# Patient Record
Sex: FEMALE | Race: BLACK OR AFRICAN AMERICAN | Employment: PART TIME | URBAN - METROPOLITAN AREA
[De-identification: names, ages, dates, MRNs, and addresses within clinical notes are randomized per-mention and may not be internally consistent; named-entity substitution may affect disease eponyms.]

---

## 2017-05-04 ENCOUNTER — HOSPITAL ENCOUNTER (EMERGENCY)
Facility: HOSPITAL | Age: 57
Discharge: HOME | End: 2017-05-04
Attending: EMERGENCY MEDICINE
Payer: COMMERCIAL

## 2017-05-04 ENCOUNTER — APPOINTMENT (OUTPATIENT)
Dept: GENERAL RADIOLOGY | Facility: HOSPITAL | Age: 57
End: 2017-05-04
Attending: EMERGENCY MEDICINE
Payer: COMMERCIAL

## 2017-05-04 VITALS
TEMPERATURE: 98 F | DIASTOLIC BLOOD PRESSURE: 87 MMHG | HEIGHT: 62 IN | HEART RATE: 81 BPM | WEIGHT: 199 LBS | BODY MASS INDEX: 36.62 KG/M2 | RESPIRATION RATE: 15 BRPM | SYSTOLIC BLOOD PRESSURE: 154 MMHG | OXYGEN SATURATION: 100 %

## 2017-05-04 DIAGNOSIS — M75.41 SHOULDER IMPINGEMENT SYNDROME, RIGHT: Primary | ICD-10-CM

## 2017-05-04 PROCEDURE — 81001 URINALYSIS AUTO W/SCOPE: CPT | Performed by: EMERGENCY MEDICINE

## 2017-05-04 PROCEDURE — 96360 HYDRATION IV INFUSION INIT: CPT

## 2017-05-04 PROCEDURE — 85025 COMPLETE CBC W/AUTO DIFF WBC: CPT | Performed by: EMERGENCY MEDICINE

## 2017-05-04 PROCEDURE — 84484 ASSAY OF TROPONIN QUANT: CPT | Performed by: EMERGENCY MEDICINE

## 2017-05-04 PROCEDURE — 83690 ASSAY OF LIPASE: CPT | Performed by: EMERGENCY MEDICINE

## 2017-05-04 PROCEDURE — 99285 EMERGENCY DEPT VISIT HI MDM: CPT

## 2017-05-04 PROCEDURE — 87086 URINE CULTURE/COLONY COUNT: CPT | Performed by: EMERGENCY MEDICINE

## 2017-05-04 PROCEDURE — 93010 ELECTROCARDIOGRAM REPORT: CPT

## 2017-05-04 PROCEDURE — 80053 COMPREHEN METABOLIC PANEL: CPT | Performed by: EMERGENCY MEDICINE

## 2017-05-04 PROCEDURE — 73030 X-RAY EXAM OF SHOULDER: CPT | Performed by: EMERGENCY MEDICINE

## 2017-05-04 PROCEDURE — 93005 ELECTROCARDIOGRAM TRACING: CPT

## 2017-05-04 RX ORDER — HYDROCODONE BITARTRATE AND ACETAMINOPHEN 5; 325 MG/1; MG/1
2 TABLET ORAL ONCE
Status: COMPLETED | OUTPATIENT
Start: 2017-05-04 | End: 2017-05-04

## 2017-05-04 RX ORDER — HYDROCODONE BITARTRATE AND ACETAMINOPHEN 5; 325 MG/1; MG/1
1-2 TABLET ORAL EVERY 6 HOURS PRN
Qty: 20 TABLET | Refills: 0 | Status: SHIPPED | OUTPATIENT
Start: 2017-05-04 | End: 2017-05-11

## 2017-05-05 NOTE — ED PROVIDER NOTES
Patient Seen in: [facility] Emergency Department    History   Patient presents with:  Nausea/Vomiting/Diarrhea (gastrointestinal)    Stated Complaint: diarrhea    HPI    The patient is a 50-year-old female status post right rotator cuff repair 3 yea 24 Hr,  TAKE ONE TABLET BY MOUTH ONCE DAILY   Clobetasol Propionate 0.05 % External Ointment,  Apply 2 x daily   LENIN MICROLET LANCETS Does not apply Misc,  Test BGs 2X/day   Glucose Blood (LENIN CONTOUR NEXT TEST) In Vitro Strip,  Test BGs 2X/day   Blood distally throughout 470s. Sensation in the axillary, ulnar, radial, median distributions are intact bilaterally. Motor strength in the same distributions are 5/5 and symmetric. HEENT: No lymphadenopathy. Oropharynx nml.  Mucus membranes moist.   Supple PLATELET    Narrative: The following orders were created for panel order CBC WITH DIFFERENTIAL WITH PLATELET.   Procedure                               Abnormality         Status                     ---------                               ----------- exam I believe her symptoms are due to musculoskeletal right shoulder pain probably likely due to shoulder impingement such as rotator cuff injury.   Her pain is exacerbated with abduction of her shoulder and reproducible with movement of her right shoulder

## 2017-05-05 NOTE — ED NOTES
Discharge instructions were reviewed and pt verbalized understanding. Pt is going home with daughter.

## 2017-05-25 PROBLEM — M54.12 CERVICAL RADICULOPATHY: Status: ACTIVE | Noted: 2017-05-25

## 2017-05-25 PROBLEM — M75.41 IMPINGEMENT SYNDROME OF RIGHT SHOULDER: Status: ACTIVE | Noted: 2017-05-25

## 2017-06-12 ENCOUNTER — HOSPITAL ENCOUNTER (INPATIENT)
Facility: HOSPITAL | Age: 57
LOS: 2 days | Discharge: HOME | DRG: 864 | End: 2017-06-14
Attending: EMERGENCY MEDICINE | Admitting: INTERNAL MEDICINE
Payer: COMMERCIAL

## 2017-06-12 ENCOUNTER — APPOINTMENT (OUTPATIENT)
Dept: GENERAL RADIOLOGY | Facility: HOSPITAL | Age: 57
DRG: 864 | End: 2017-06-12
Attending: EMERGENCY MEDICINE
Payer: COMMERCIAL

## 2017-06-12 ENCOUNTER — APPOINTMENT (OUTPATIENT)
Dept: CT IMAGING | Facility: HOSPITAL | Age: 57
DRG: 864 | End: 2017-06-12
Attending: EMERGENCY MEDICINE
Payer: COMMERCIAL

## 2017-06-12 DIAGNOSIS — R50.9 FEVER, UNSPECIFIED FEVER CAUSE: ICD-10-CM

## 2017-06-12 DIAGNOSIS — N39.0 URINARY TRACT INFECTION, SITE UNSPECIFIED: Primary | ICD-10-CM

## 2017-06-12 DIAGNOSIS — E86.0 DEHYDRATION: ICD-10-CM

## 2017-06-12 PROCEDURE — 71260 CT THORAX DX C+: CPT | Performed by: EMERGENCY MEDICINE

## 2017-06-12 PROCEDURE — 81001 URINALYSIS AUTO W/SCOPE: CPT | Performed by: EMERGENCY MEDICINE

## 2017-06-12 PROCEDURE — 87798 DETECT AGENT NOS DNA AMP: CPT | Performed by: HOSPITALIST

## 2017-06-12 PROCEDURE — 80053 COMPREHEN METABOLIC PANEL: CPT | Performed by: EMERGENCY MEDICINE

## 2017-06-12 PROCEDURE — 96374 THER/PROPH/DIAG INJ IV PUSH: CPT

## 2017-06-12 PROCEDURE — 87040 BLOOD CULTURE FOR BACTERIA: CPT | Performed by: EMERGENCY MEDICINE

## 2017-06-12 PROCEDURE — 83605 ASSAY OF LACTIC ACID: CPT | Performed by: EMERGENCY MEDICINE

## 2017-06-12 PROCEDURE — 87633 RESP VIRUS 12-25 TARGETS: CPT | Performed by: HOSPITALIST

## 2017-06-12 PROCEDURE — 96361 HYDRATE IV INFUSION ADD-ON: CPT

## 2017-06-12 PROCEDURE — 36415 COLL VENOUS BLD VENIPUNCTURE: CPT

## 2017-06-12 PROCEDURE — 99285 EMERGENCY DEPT VISIT HI MDM: CPT

## 2017-06-12 PROCEDURE — 87086 URINE CULTURE/COLONY COUNT: CPT | Performed by: EMERGENCY MEDICINE

## 2017-06-12 PROCEDURE — 87486 CHLMYD PNEUM DNA AMP PROBE: CPT | Performed by: HOSPITALIST

## 2017-06-12 PROCEDURE — 74177 CT ABD & PELVIS W/CONTRAST: CPT | Performed by: EMERGENCY MEDICINE

## 2017-06-12 PROCEDURE — 87581 M.PNEUMON DNA AMP PROBE: CPT | Performed by: HOSPITALIST

## 2017-06-12 PROCEDURE — 83036 HEMOGLOBIN GLYCOSYLATED A1C: CPT | Performed by: HOSPITALIST

## 2017-06-12 PROCEDURE — 85025 COMPLETE CBC W/AUTO DIFF WBC: CPT | Performed by: EMERGENCY MEDICINE

## 2017-06-12 PROCEDURE — 82962 GLUCOSE BLOOD TEST: CPT

## 2017-06-12 PROCEDURE — 71010 XR CHEST AP PORTABLE  (CPT=71010): CPT | Performed by: EMERGENCY MEDICINE

## 2017-06-12 RX ORDER — ASPIRIN 81 MG/1
81 TABLET, CHEWABLE ORAL DAILY
Status: DISCONTINUED | OUTPATIENT
Start: 2017-06-13 | End: 2017-06-14

## 2017-06-12 RX ORDER — IBUPROFEN 600 MG/1
600 TABLET ORAL ONCE
Status: COMPLETED | OUTPATIENT
Start: 2017-06-12 | End: 2017-06-12

## 2017-06-12 RX ORDER — SODIUM CHLORIDE 9 MG/ML
INJECTION, SOLUTION INTRAVENOUS CONTINUOUS
Status: CANCELLED | OUTPATIENT
Start: 2017-06-12 | End: 2017-06-12

## 2017-06-12 RX ORDER — LEVOFLOXACIN 5 MG/ML
750 INJECTION, SOLUTION INTRAVENOUS ONCE
Status: DISCONTINUED | OUTPATIENT
Start: 2017-06-12 | End: 2017-06-12

## 2017-06-12 RX ORDER — SODIUM CHLORIDE 9 MG/ML
INJECTION, SOLUTION INTRAVENOUS CONTINUOUS
Status: DISCONTINUED | OUTPATIENT
Start: 2017-06-12 | End: 2017-06-14

## 2017-06-12 RX ORDER — ATORVASTATIN CALCIUM 20 MG/1
20 TABLET, FILM COATED ORAL DAILY
Status: DISCONTINUED | OUTPATIENT
Start: 2017-06-13 | End: 2017-06-14

## 2017-06-12 RX ORDER — ACETAMINOPHEN AND CODEINE PHOSPHATE 300; 30 MG/1; MG/1
1 TABLET ORAL EVERY 4 HOURS PRN
Status: DISCONTINUED | OUTPATIENT
Start: 2017-06-12 | End: 2017-06-14

## 2017-06-12 RX ORDER — ACETAMINOPHEN 500 MG
1000 TABLET ORAL ONCE
Status: COMPLETED | OUTPATIENT
Start: 2017-06-12 | End: 2017-06-12

## 2017-06-12 RX ORDER — DEXTROSE MONOHYDRATE 25 G/50ML
50 INJECTION, SOLUTION INTRAVENOUS
Status: DISCONTINUED | OUTPATIENT
Start: 2017-06-12 | End: 2017-06-14

## 2017-06-12 NOTE — ED PROVIDER NOTES
Patient Seen in: [facility] Emergency Department    History   Patient presents with:  Fever (infectious)  Cough/URI    Stated Complaint: uri/ chest congestion    HPI    59-year-old female presents emergency with chief complaint of fever.   Symptoms s BY MOUTH IN THE EVENING   Metoprolol Succinate ER 50 MG Oral Tablet 24 Hr,  TAKE ONE TABLET BY MOUTH ONCE DAILY   LENIN MICROLET LANCETS Does not apply Misc,  Test BGs 2X/day   Glucose Blood (LENIN CONTOUR NEXT TEST) In Vitro Strip,  Test BGs 2X/day   Bloo there is no scleral icterus. Mucous membranes are slightly dry, oropharynx is clear, uvula midline. Mild posterior pharyngeal erythema. Scalp is atraumatic. NECK: Neck is supple, there is no nuchal rigidity. No carotid bruits. No masses.   Trachea mid Status                     ---------                               -----------         ------                     CBC W/ DIFFERENTIAL[[id number]]          Abnormal            Final result                 Please view results for these tests on the individua

## 2017-06-13 PROCEDURE — 84145 PROCALCITONIN (PCT): CPT | Performed by: HOSPITALIST

## 2017-06-13 PROCEDURE — 82962 GLUCOSE BLOOD TEST: CPT

## 2017-06-13 PROCEDURE — 87999 UNLISTED MICROBIOLOGY PX: CPT

## 2017-06-13 PROCEDURE — 85610 PROTHROMBIN TIME: CPT | Performed by: HOSPITALIST

## 2017-06-13 PROCEDURE — 85025 COMPLETE CBC W/AUTO DIFF WBC: CPT | Performed by: HOSPITALIST

## 2017-06-13 PROCEDURE — 80053 COMPREHEN METABOLIC PANEL: CPT | Performed by: HOSPITALIST

## 2017-06-13 PROCEDURE — 85730 THROMBOPLASTIN TIME PARTIAL: CPT | Performed by: HOSPITALIST

## 2017-06-13 PROCEDURE — 94660 CPAP INITIATION&MGMT: CPT

## 2017-06-13 RX ORDER — POLYETHYLENE GLYCOL 3350 17 G/17G
17 POWDER, FOR SOLUTION ORAL DAILY PRN
Status: DISCONTINUED | OUTPATIENT
Start: 2017-06-13 | End: 2017-06-14

## 2017-06-13 RX ORDER — DOCUSATE SODIUM 100 MG/1
100 CAPSULE, LIQUID FILLED ORAL 2 TIMES DAILY
Status: DISCONTINUED | OUTPATIENT
Start: 2017-06-13 | End: 2017-06-14

## 2017-06-13 RX ORDER — ENOXAPARIN SODIUM 100 MG/ML
40 INJECTION SUBCUTANEOUS DAILY
Status: DISCONTINUED | OUTPATIENT
Start: 2017-06-13 | End: 2017-06-14

## 2017-06-13 NOTE — PROGRESS NOTES
NURSING ADMISSION NOTE      Patient admitted via Cart  Oriented to room. Safety precautions initiated. Bed in low position. Call light in reach. Received pt from ED. VSS upon arrival. Admission database completed. Admission orders received.   Pt e

## 2017-06-13 NOTE — PROGRESS NOTES
06/12/17 2326   Provider Notification   Reason for Communication Other (comment)  (home meds)   Provider Name MD Cheikh Bauer   Method of Communication Page   Response Waiting for response   Notification Time [phone]   MD called back, will order

## 2017-06-13 NOTE — PROGRESS NOTES
06/13/17 6676   Provider Notification   Reason for Communication New consult   Provider Name MD Paulino Choudhury   Method of Communication Page   Response Waiting for response   Notification Time 0629   MD on call called back, will see pt today, no new orders

## 2017-06-13 NOTE — PLAN OF CARE
GENITOURINARY - ADULT    • Absence of urinary retention Progressing        METABOLIC/FLUID AND ELECTROLYTES - ADULT    • Glucose maintained within prescribed range Progressing        Patient/Family Goals    • Patient/Family Long Term Goal Progressing    •

## 2017-06-13 NOTE — CM/SW NOTE
06/13/17 1500   CM/SW Screening   Referral [phone] staff; Chart review;Nursing rounds   Patient's Mental Status Alert;Oriented   Patient lives with Spouse   Patient Status Prior to Admission   Independent with ADLs an

## 2017-06-13 NOTE — DIETARY MALNUTRITION NOTE
NUTRITION INITIAL ASSESSMENT    Pt is at moderate nutrition risk. Pt meets severe malnutrition criteria.     NUTRITION DIAGNOSIS/PROBLEM:    Malnutrition related to inability to consume sufficient energy as evidenced by consuming <50% of estimated energy ne RELATED PHYSICAL FINDINGS:     1. Body Fat/Muscle Mass: BMI:p 32.92     2.  Fluid Accumulation: None noted    NUTRITION PRESCRIPTION: Using IBW: 51.1 kg  Calories: 3032-3521 calories/day (25-30 calories per kg)  Protein: 66-92 grams protein/day (1.3-1.8 gra

## 2017-06-13 NOTE — CONSULTS
[facility] Urology   Consultation Note    Amelia Rojas Patient Status:  Inpatient    1960 MRN [id number]   [facility] 5NW-A Attending Gwen Rivas MD   Hosp Day # 1 PCP Zaki Portillo MD     Reason for Consultation: PACEMAKER/DEFIBRILLATOR      CHOLECYSTECTOMY       Family History   Problem Relation Age of Onset   • Diabetes Father    • Hypertension Mother    • Heart Disorder Mother       of chf at 80   • Other[other] [OTHER] Mother    • Dementia Mother    • Heart items are noted in HPI. Physical Exam:  /75 mmHg  Pulse 94  Temp(Src) 99.2 °F (37.3 °C) (Oral)  Resp 20  Wt 183 lb (83.008 kg)  SpO2 100%  GENERAL: The patient is resting in bed in no acute distress. HEENT: Unremarkable. NECK: Supple.     LUNGS No hydronephrosis or perinephric stranding. ADRENALS:  Not enlarged. AORTA/VASCULAR:  No abdominal aortic aneurysm. RETROPERITONEUM:  No adenopathy. BOWEL/MESENTERY:  Normal bowel caliber. No colonic inflammation.  Large amount of stool scattered throug symptoms  Pelvic mass    Recommendations:  -Upper urinary tract is normal without hydronephrosis. -Preliminary urine culture revealed 25 K CFU/mL mixed gram positive mateo. Check final urine culture, blood culture results.  Infectious disease has been c

## 2017-06-13 NOTE — PROGRESS NOTES
[facility] Note:  Dose Adjustment for Levaquin (levofloxacin)    Inocencio Galvan is a 61year old female who has been prescribed Levaquin (levofloxacin) 500 mg IVPB once in ED.   The dose has been adjusted to Levaquin (levofloxacin) 750 mg IVPB once per hosp

## 2017-06-13 NOTE — ED PROVIDER NOTES
[facility]      Sepsis Reassessment Note    /56 mmHg  Pulse 107  Temp(Src) 99.8 °F (37.7 °C) (Temporal)  Resp 12  SpO2 99%     8:29 PM    Cardiac:  Regularity: Regular  Rate: Normal  Heart Sounds: S1,S2    Lungs:   Right: Clear  Left: Clear

## 2017-06-13 NOTE — H&P
Bogdan Power [address] A White Patient Status:  Inpatient    1960 MRN [id number]   [facility] 5NW-A Attending Aissatou Martin MD   Hosp Day # 1 PCP Geetha Canales MD     Cc: fever, sore throat    History of Surgeon: Mauro Shah MD;  Location: [address] [place] [address]    OTHER SURGICAL HISTORY  3/17/2014    Comment Right RCR     PACEMAKER/DEFIBRILLATOR      CHOLECYSTECTOMY       Family History   Problem Relation Age of Onset   • Diabetes Father    • Hype MICROLET LANCETS Does not apply Misc Test BGs 2X/day Disp: 180 each Rfl: 3 Taking   Glucose Blood (LENIN CONTOUR NEXT TEST) In Vitro Strip Test BGs 2X/day Disp: 180 strip Rfl: 3 Taking   Blood Glucose Calibration (LENIN CONTOUR NEXT CONTROL) NORMAL In Vitr the pelvis. There are no aggressive features. Mesenchymal/stromal tumors and desmoid are considerations. A malignant etiology is felt less likely, not excludable. The appearance does not suggest a carcinoid. This appears separate from the ovaries.  Although DMII, aflutter s/p ablation, JORDEN who presented [facility] [place] [facility] yesterday evening for evaluation of fevers. Pt also with malaise, sore throat, cough, and lower back pain. All symptoms started yesterday.  Pt has also had intermittent tingling/numbness of RLE for 1 for back pain. CT a/p with large amt of stool noted. Even with report of loose BMs, pt would benefit from a gentle laxative to get things moving some more since large stool noted in imaging likely contributing to back pain as well.      Management of other

## 2017-06-13 NOTE — CONSULTS
INFECTIOUS DISEASE CONSULTATION    Albino [facility] Patient Status:  Inpatient    1960 MRN [id number]   [facility] 5NW-A Attending Susan Villagran MD   [facility] Day # 1 PCP Alonso Guajardo Rash  Penicillins             Rash    Medications:    Current facility-administered medications:   •  docusate sodium (COLACE) cap 100 mg, 100 mg, Oral, BID  •  PEG 3350 (MIRALAX) powder packet 17 g, 17 g, Oral, Daily PRN  •  magnesium hydroxide (MILK OF M nontender, nondistended. Positive bowel sounds. Musculoskeletal: Full range of motion of all extremities. No swelling noted. Joints: no effusions  Skin: No lesions.  No erythema, no open wounds      Laboratory Data:      Recent Labs   Lab  06/13/17   06

## 2017-06-13 NOTE — PROGRESS NOTES
[facility] Dosing Service  Antibiotic Dosing    Fiore Quan is a 61year old female for whom pharmacy is dosing Ceftriaxone for treatment of pyelonephritis. Allergies: is allergic to clindamycin and penicillins.     Vitals: /82 mmHg  Pulse 91  T

## 2017-06-14 ENCOUNTER — APPOINTMENT (OUTPATIENT)
Dept: MRI IMAGING | Facility: HOSPITAL | Age: 57
DRG: 864 | End: 2017-06-14
Attending: HOSPITALIST
Payer: COMMERCIAL

## 2017-06-14 VITALS
HEART RATE: 87 BPM | WEIGHT: 183 LBS | OXYGEN SATURATION: 99 % | DIASTOLIC BLOOD PRESSURE: 94 MMHG | RESPIRATION RATE: 18 BRPM | BODY MASS INDEX: 33 KG/M2 | SYSTOLIC BLOOD PRESSURE: 152 MMHG | TEMPERATURE: 98 F

## 2017-06-14 PROBLEM — R19.00 PELVIC MASS IN FEMALE: Status: ACTIVE | Noted: 2017-06-14

## 2017-06-14 PROCEDURE — 80048 BASIC METABOLIC PNL TOTAL CA: CPT | Performed by: PHYSICIAN ASSISTANT

## 2017-06-14 PROCEDURE — 72158 MRI LUMBAR SPINE W/O & W/DYE: CPT | Performed by: HOSPITALIST

## 2017-06-14 PROCEDURE — A9575 INJ GADOTERATE MEGLUMI 0.1ML: HCPCS | Performed by: HOSPITALIST

## 2017-06-14 PROCEDURE — 85025 COMPLETE CBC W/AUTO DIFF WBC: CPT | Performed by: PHYSICIAN ASSISTANT

## 2017-06-14 PROCEDURE — 82962 GLUCOSE BLOOD TEST: CPT

## 2017-06-14 RX ORDER — POTASSIUM CHLORIDE 20 MEQ/1
40 TABLET, EXTENDED RELEASE ORAL EVERY 4 HOURS
Status: COMPLETED | OUTPATIENT
Start: 2017-06-14 | End: 2017-06-14

## 2017-06-14 RX ORDER — PSEUDOEPHEDRINE HCL 30 MG
100 TABLET ORAL 2 TIMES DAILY
Qty: 60 CAPSULE | Refills: 0 | Status: SHIPPED | OUTPATIENT
Start: 2017-06-14 | End: 2017-06-28

## 2017-06-14 RX ORDER — POLYETHYLENE GLYCOL 3350 17 G/17G
17 POWDER, FOR SOLUTION ORAL DAILY PRN
Qty: 72 EACH | Refills: 0 | Status: SHIPPED | OUTPATIENT
Start: 2017-06-14 | End: 2017-06-28

## 2017-06-14 NOTE — PROGRESS NOTES
06/14/17 1342   Clinical Encounter Type   Visited With Patient   Routine Visit Discharge  (Anticipates discharge today.)   Patient's Supportive Strategies/Resources Patient expressed feelings of appreciation, feeling connected, comforted and cared for b

## 2017-06-14 NOTE — PROGRESS NOTES
[facility]                INFECTIOUS DISEASE PROGRESS NOTE    Patsey Mood Patient Status:  Inpatient    1960 MRN [id number]   [facility] 5NW-A Attending Mekhi Martínez MD   Hosp Day # 2 PCP Alisa Curiel MD     Anti radiculopathy     Impingement syndrome of right shoulder     Urinary tract infection     Urinary tract infection, site unspecified     Fever, unspecified fever cause     Dehydration      ASSESSMENT/PLAN:  1. Fever/URI symptoms improved  Stable off abx  2.

## 2017-06-14 NOTE — PROGRESS NOTES
NURSING DISCHARGE NOTE    Discharged Home via Wheelchair. Accompanied by Family member  Belongings Taken by patient/family. Patient discharged home per attending, hem/onc and ID.   Discharge instructions and follow up information given to patient an

## 2017-06-14 NOTE — PROGRESS NOTES
[facility] Hospitalist Progress Note                                                                   [facility] A White  4/17/1960    CC: FU fevers    Interval History:  - Doing well, afebrile overnigh 3.0*   --    NA  133*  141  141   K  3.6  3.4*  3.3*   CL  98*  108  107   CO2  26.0  27.0  27.0   ALKPHO  109  88   --    AST  28  16   --    ALT  35  25   --    BILT  0.5  0.5   --    TP  8.5*  7.1   --            ROS: no change to ROS from my documentat

## 2017-06-14 NOTE — PLAN OF CARE
GENITOURINARY - ADULT    • Absence of urinary retention Progressing        Patient/Family Goals    • Patient/Family Long Term Goal Progressing    • Patient/Family Short Term Goal Progressing        RISK FOR INFECTION - ADULT    • Absence of fever/infection

## 2017-06-14 NOTE — PROGRESS NOTES
[facility]  Report of Consultation    J Luis Fabian Patient Status:  Inpatient    1960 MRN [id number]   [facility] 5NW-A Attending Umesh Elizondo MD   Hosp Day # 2 PCP William Britt MD     REASON FOR CONSULTATIONSandbebeto Moran Rash    Medications:    Current facility-administered medications:   •  Potassium Chloride ER (K-DUR M20) CR tab 40 mEq, 40 mEq, Oral, Q4H  •  docusate sodium (COLACE) cap 100 mg, 100 mg, Oral, BID  •  PEG 3350 (MIRALAX) powder packet 17 g, 17 g, auscultation. Heart: Regular rate and rhythm. Abdomen: Soft, non tender with good bowel sounds. Extremities: Pedal pulses are present. No edema. Neurological: Grossly intact. Lymphatics:  There is no palpable lymphadenopathy      DIAGNOSTIC WORK site unspecified     Fever, unspecified fever cause     Dehydration      1.  Pelvic mass  This a well circumscribed mass measuring 3.7 cms max  Could be a benign mass such as desmoid tumor   Would need proper pelvic imaging first then a decision on resectio

## 2017-06-14 NOTE — RESPIRATORY THERAPY NOTE
JORDEN - Equipment Use Daily Summary:                  . Set Mode: AUTO CPAP WITH C-FLEX                . Usage in hours: 8:35                . 90% Pressure (EPAP) level: 10.2                . 90% Insp. Pressure (IPAP): Chiquita Claudio  AHI: 1.1

## 2017-06-22 NOTE — DISCHARGE SUMMARY
General Medicine Discharge Summary     Patient ID:  Ugo Meek  61year old  4/17/1960    Admit date: 6/12/2017    Discharge date and time:  6/21/17    Attending Physician: No att. providers found is low, lactic acid WNL  - MRI L spine checked due to low back pain- shows only mild arthritis  - ID on consult appreciate recs- doing well off Abx, I think OK for DC today as she has remained afebrile and workup has been unrevealing; suspect fever on admi R-3    Glucose Blood (LENIN CONTOUR NEXT TEST) In Vitro Strip  Test BGs 2X/day, Mail Order, Disp-180 strip, R-3    Blood Glucose Calibration (LENIN CONTOUR NEXT CONTROL) NORMAL In Vitro Solution  1 vial by In Vitro route as needed.  Use control solution wit

## 2017-07-05 PROCEDURE — 82043 UR ALBUMIN QUANTITATIVE: CPT | Performed by: INTERNAL MEDICINE

## 2017-07-05 PROCEDURE — 82570 ASSAY OF URINE CREATININE: CPT | Performed by: INTERNAL MEDICINE

## 2017-07-07 PROBLEM — R29.6 MULTIPLE FALLS: Status: ACTIVE | Noted: 2017-07-07

## 2017-07-07 PROBLEM — M79.604 RIGHT LEG PAIN: Status: ACTIVE | Noted: 2017-07-07

## 2017-07-07 PROBLEM — G62.9 SENSORY NEUROPATHY: Status: ACTIVE | Noted: 2017-07-07

## 2017-07-07 PROCEDURE — 84425 ASSAY OF VITAMIN B-1: CPT | Performed by: OTHER

## 2017-07-07 PROCEDURE — 83883 ASSAY NEPHELOMETRY NOT SPEC: CPT | Performed by: OTHER

## 2017-07-07 PROCEDURE — 86618 LYME DISEASE ANTIBODY: CPT | Performed by: OTHER

## 2017-07-07 PROCEDURE — 82746 ASSAY OF FOLIC ACID SERUM: CPT | Performed by: OTHER

## 2017-07-07 PROCEDURE — 82607 VITAMIN B-12: CPT | Performed by: OTHER

## 2017-07-07 PROCEDURE — 86334 IMMUNOFIX E-PHORESIS SERUM: CPT | Performed by: OTHER

## 2017-07-07 PROCEDURE — 83921 ORGANIC ACID SINGLE QUANT: CPT | Performed by: OTHER

## 2017-07-07 PROCEDURE — 84165 PROTEIN E-PHORESIS SERUM: CPT | Performed by: OTHER

## 2018-03-14 PROCEDURE — 82570 ASSAY OF URINE CREATININE: CPT | Performed by: INTERNAL MEDICINE

## 2018-03-14 PROCEDURE — 82043 UR ALBUMIN QUANTITATIVE: CPT | Performed by: INTERNAL MEDICINE

## 2018-05-03 ENCOUNTER — HOSPITAL (OUTPATIENT)
Dept: OTHER | Age: 58
End: 2018-05-03
Attending: EMERGENCY MEDICINE

## 2018-05-03 LAB
ANALYZER ANC (IANC): ABNORMAL
ANION GAP SERPL CALC-SCNC: 10 MMOL/L (ref 10–20)
BASOPHILS # BLD: 0 THOUSAND/MCL (ref 0–0.3)
BASOPHILS NFR BLD: 0 %
BUN SERPL-MCNC: 15 MG/DL (ref 6–20)
BUN/CREAT SERPL: 24 (ref 7–25)
CALCIUM SERPL-MCNC: 10 MG/DL (ref 8.4–10.2)
CHLORIDE: 103 MMOL/L (ref 98–107)
CO2 SERPL-SCNC: 29 MMOL/L (ref 21–32)
CREAT SERPL-MCNC: 0.63 MG/DL (ref 0.51–0.95)
DIFFERENTIAL METHOD BLD: ABNORMAL
EOSINOPHIL # BLD: 0.2 THOUSAND/MCL (ref 0.1–0.5)
EOSINOPHIL NFR BLD: 2 %
ERYTHROCYTE [DISTWIDTH] IN BLOOD: 13.2 % (ref 11–15)
GLUCOSE SERPL-MCNC: 165 MG/DL (ref 65–99)
HEMATOCRIT: 33 % (ref 36–46.5)
HGB BLD-MCNC: 11.6 GM/DL (ref 12–15.5)
LYMPHOCYTES # BLD: 2.8 THOUSAND/MCL (ref 1–4)
LYMPHOCYTES NFR BLD: 41 %
MCH RBC QN AUTO: 29.8 PG (ref 26–34)
MCHC RBC AUTO-ENTMCNC: 35.2 GM/DL (ref 32–36.5)
MCV RBC AUTO: 84.8 FL (ref 78–100)
MONOCYTES # BLD: 0.5 THOUSAND/MCL (ref 0.3–0.9)
MONOCYTES NFR BLD: 7 %
NEUTROPHILS # BLD: 3.4 THOUSAND/MCL (ref 1.8–7.7)
NEUTROPHILS NFR BLD: 50 %
NEUTS SEG NFR BLD: ABNORMAL %
PERCENT NRBC: ABNORMAL
PLATELET # BLD: 318 THOUSAND/MCL (ref 140–450)
POTASSIUM SERPL-SCNC: 3.4 MMOL/L (ref 3.4–5.1)
RBC # BLD: 3.89 MILLION/MCL (ref 4–5.2)
SODIUM SERPL-SCNC: 139 MMOL/L (ref 135–145)
WBC # BLD: 6.8 THOUSAND/MCL (ref 4.2–11)

## 2018-05-10 LAB
ALBUMIN SERPL-MCNC: 3.8 GM/DL (ref 3.6–5.1)
ALBUMIN/GLOB SERPL: 0.9 {RATIO} (ref 1–2.4)
ALP SERPL-CCNC: 78 UNIT/L (ref 45–117)
ALT SERPL-CCNC: 33 UNIT/L
ANALYZER ANC (IANC): ABNORMAL
ANION GAP SERPL CALC-SCNC: 15 MMOL/L (ref 10–20)
APTT PPP: 23 SECONDS (ref 22–30)
APTT PPP: NORMAL S
AST SERPL-CCNC: 14 UNIT/L
BASOPHILS # BLD: 0 THOUSAND/MCL (ref 0–0.3)
BASOPHILS NFR BLD: 0 %
BILIRUB SERPL-MCNC: 0.4 MG/DL (ref 0.2–1)
BUN SERPL-MCNC: 14 MG/DL (ref 6–20)
BUN/CREAT SERPL: 19 (ref 7–25)
CALCIUM SERPL-MCNC: 9.4 MG/DL (ref 8.4–10.2)
CHLORIDE: 103 MMOL/L (ref 98–107)
CO2 SERPL-SCNC: 28 MMOL/L (ref 21–32)
CREAT SERPL-MCNC: 0.72 MG/DL (ref 0.51–0.95)
DIFFERENTIAL METHOD BLD: ABNORMAL
EOSINOPHIL # BLD: 0 THOUSAND/MCL (ref 0.1–0.5)
EOSINOPHIL NFR BLD: 0 %
ERYTHROCYTE [DISTWIDTH] IN BLOOD: 12.8 % (ref 11–15)
GLOBULIN SER-MCNC: 4.2 GM/DL (ref 2–4)
GLUCOSE SERPL-MCNC: 205 MG/DL (ref 65–99)
HEMATOCRIT: 35.3 % (ref 36–46.5)
HGB BLD-MCNC: 12.2 GM/DL (ref 12–15.5)
INR PPP: 1
LYMPHOCYTES # BLD: 4.5 THOUSAND/MCL (ref 1–4)
LYMPHOCYTES NFR BLD: 48 %
MCH RBC QN AUTO: 29.3 PG (ref 26–34)
MCHC RBC AUTO-ENTMCNC: 34.6 GM/DL (ref 32–36.5)
MCV RBC AUTO: 84.7 FL (ref 78–100)
MONOCYTES # BLD: 0.8 THOUSAND/MCL (ref 0.3–0.9)
MONOCYTES NFR BLD: 9 %
NEUTROPHILS # BLD: 4.1 THOUSAND/MCL (ref 1.8–7.7)
NEUTROPHILS NFR BLD: 43 %
NEUTS SEG NFR BLD: ABNORMAL %
PERCENT NRBC: ABNORMAL
PLATELET # BLD: 372 THOUSAND/MCL (ref 140–450)
POTASSIUM SERPL-SCNC: 3.6 MMOL/L (ref 3.4–5.1)
PROT SERPL-MCNC: 8 GM/DL (ref 6.4–8.2)
PROTHROMBIN TIME: 10.5 SECONDS (ref 9.7–11.8)
PROTHROMBIN TIME: NORMAL
RBC # BLD: 4.17 MILLION/MCL (ref 4–5.2)
SODIUM SERPL-SCNC: 142 MMOL/L (ref 135–145)
TROPONIN I SERPL HS-MCNC: <0.02 NG/ML
WBC # BLD: 9.5 THOUSAND/MCL (ref 4.2–11)

## 2018-05-11 ENCOUNTER — HOSPITAL (OUTPATIENT)
Dept: OTHER | Age: 58
End: 2018-05-11
Attending: INTERNAL MEDICINE

## 2018-05-11 ENCOUNTER — DIAGNOSTIC TRANS (OUTPATIENT)
Dept: OTHER | Age: 58
End: 2018-05-11

## 2018-05-11 LAB
ANALYZER ANC (IANC): ABNORMAL
ANION GAP SERPL CALC-SCNC: 10 MMOL/L (ref 10–20)
BUN SERPL-MCNC: 14 MG/DL (ref 6–20)
BUN/CREAT SERPL: 23 (ref 7–25)
CALCIUM SERPL-MCNC: 8.8 MG/DL (ref 8.4–10.2)
CHLORIDE: 105 MMOL/L (ref 98–107)
CO2 SERPL-SCNC: 31 MMOL/L (ref 21–32)
CREAT SERPL-MCNC: 0.62 MG/DL (ref 0.51–0.95)
ERYTHROCYTE [DISTWIDTH] IN BLOOD: 13.1 % (ref 11–15)
GLUCOSE BLDC GLUCOMTR-MCNC: 124 MG/DL (ref 65–99)
GLUCOSE BLDC GLUCOMTR-MCNC: 138 MG/DL (ref 65–99)
GLUCOSE SERPL-MCNC: 121 MG/DL (ref 65–99)
HEMATOCRIT: 34.1 % (ref 36–46.5)
HGB BLD-MCNC: 11.7 GM/DL (ref 12–15.5)
MCH RBC QN AUTO: 29.3 PG (ref 26–34)
MCHC RBC AUTO-ENTMCNC: 34.3 GM/DL (ref 32–36.5)
MCV RBC AUTO: 85.3 FL (ref 78–100)
PERCENT NRBC: ABNORMAL
PLATELET # BLD: 358 THOUSAND/MCL (ref 140–450)
POTASSIUM SERPL-SCNC: 3.8 MMOL/L (ref 3.4–5.1)
RBC # BLD: 4 MILLION/MCL (ref 4–5.2)
SODIUM SERPL-SCNC: 142 MMOL/L (ref 135–145)
WBC # BLD: 7.6 THOUSAND/MCL (ref 4.2–11)

## 2018-05-14 ENCOUNTER — HOSPITAL (OUTPATIENT)
Dept: OTHER | Age: 58
End: 2018-05-14
Attending: HOSPITALIST

## 2018-05-21 PROCEDURE — 84425 ASSAY OF VITAMIN B-1: CPT | Performed by: OTHER

## 2018-05-21 PROCEDURE — 82746 ASSAY OF FOLIC ACID SERUM: CPT | Performed by: OTHER

## 2018-05-21 PROCEDURE — 82607 VITAMIN B-12: CPT | Performed by: OTHER

## 2018-05-21 PROCEDURE — 86618 LYME DISEASE ANTIBODY: CPT | Performed by: OTHER

## 2018-06-01 ENCOUNTER — HOSPITAL (OUTPATIENT)
Dept: OTHER | Age: 58
End: 2018-06-01
Attending: HOSPITALIST

## 2018-08-28 PROCEDURE — 87086 URINE CULTURE/COLONY COUNT: CPT | Performed by: EMERGENCY MEDICINE

## 2019-04-10 PROCEDURE — 87624 HPV HI-RISK TYP POOLED RSLT: CPT | Performed by: FAMILY MEDICINE

## 2019-04-10 PROCEDURE — 88175 CYTOPATH C/V AUTO FLUID REDO: CPT | Performed by: FAMILY MEDICINE

## 2019-06-12 ENCOUNTER — DIAGNOSTIC TRANS (OUTPATIENT)
Dept: OTHER | Age: 59
End: 2019-06-12

## 2019-06-12 LAB
ANALYZER ANC (IANC): ABNORMAL
ANION GAP SERPL CALC-SCNC: 8 MMOL/L (ref 10–20)
BUN SERPL-MCNC: 9 MG/DL (ref 6–20)
BUN/CREAT SERPL: 16 (ref 7–25)
CALCIUM SERPL-MCNC: 8.9 MG/DL (ref 8.4–10.2)
CHLORIDE SERPL-SCNC: 107 MMOL/L (ref 98–107)
CO2 SERPL-SCNC: 30 MMOL/L (ref 21–32)
CREAT SERPL-MCNC: 0.56 MG/DL (ref 0.51–0.95)
ERYTHROCYTE [DISTWIDTH] IN BLOOD: 13.2 % (ref 11–15)
GLUCOSE SERPL-MCNC: 155 MG/DL (ref 65–99)
HCT VFR BLD CALC: 32.1 % (ref 36–46.5)
HGB BLD-MCNC: 10.8 G/DL (ref 12–15.5)
MCH RBC QN AUTO: 29.1 PG (ref 26–34)
MCHC RBC AUTO-ENTMCNC: 33.6 G/DL (ref 32–36.5)
MCV RBC AUTO: 86.5 FL (ref 78–100)
NRBC (NRBCRE): 0 /100 WBC
PLATELET # BLD: 294 K/MCL (ref 140–450)
POTASSIUM SERPL-SCNC: 3.6 MMOL/L (ref 3.4–5.1)
RBC # BLD: 3.71 MIL/MCL (ref 4–5.2)
SODIUM SERPL-SCNC: 141 MMOL/L (ref 135–145)
WBC # BLD: 5.4 K/MCL (ref 4.2–11)

## 2019-08-09 PROBLEM — I10 ESSENTIAL HYPERTENSION: Status: ACTIVE | Noted: 2019-08-09

## 2019-09-03 ENCOUNTER — HOSPITAL (OUTPATIENT)
Dept: OTHER | Age: 59
End: 2019-09-03

## 2019-09-03 ENCOUNTER — DIAGNOSTIC TRANS (OUTPATIENT)
Dept: OTHER | Age: 59
End: 2019-09-03

## 2019-09-03 LAB
ALBUMIN SERPL-MCNC: 3.8 G/DL (ref 3.6–5.1)
ALP SERPL-CCNC: 133 UNITS/L (ref 45–117)
ALT SERPL-CCNC: 84 UNITS/L
AMORPH SED URNS QL MICRO: ABNORMAL
ANALYZER ANC (IANC): ABNORMAL
ANION GAP SERPL CALC-SCNC: 11 MMOL/L (ref 10–20)
APPEARANCE UR: CLEAR
AST SERPL-CCNC: 95 UNITS/L
AST SERPL-CCNC: ABNORMAL U/L
BACTERIA #/AREA URNS HPF: ABNORMAL /HPF
BASOPHILS # BLD: 0 K/MCL (ref 0–0.3)
BASOPHILS NFR BLD: 1 %
BILIRUB CONJ SERPL-MCNC: 0.1 MG/DL (ref 0–0.2)
BILIRUB SERPL-MCNC: 0.4 MG/DL (ref 0.2–1)
BILIRUB UR QL: NEGATIVE
BUN SERPL-MCNC: 12 MG/DL (ref 6–20)
BUN/CREAT SERPL: 19 (ref 7–25)
CALCIUM SERPL-MCNC: 8.7 MG/DL (ref 8.4–10.2)
CAOX CRY URNS QL MICRO: ABNORMAL
CHLORIDE SERPL-SCNC: 106 MMOL/L (ref 98–107)
CO2 SERPL-SCNC: 28 MMOL/L (ref 21–32)
COLOR UR: ABNORMAL
CREAT SERPL-MCNC: 0.62 MG/DL (ref 0.51–0.95)
DIFFERENTIAL METHOD BLD: ABNORMAL
EOSINOPHIL # BLD: 0.1 K/MCL (ref 0.1–0.5)
EOSINOPHIL NFR BLD: 1 %
EPITH CASTS #/AREA URNS LPF: ABNORMAL /[LPF]
ERYTHROCYTE [DISTWIDTH] IN BLOOD: 13.6 % (ref 11–15)
FATTY CASTS #/AREA URNS LPF: ABNORMAL /[LPF]
GLUCOSE SERPL-MCNC: 125 MG/DL (ref 65–99)
GLUCOSE UR-MCNC: NEGATIVE MG/DL
GRAN CASTS #/AREA URNS LPF: ABNORMAL /[LPF]
HCT VFR BLD CALC: 32.7 % (ref 36–46.5)
HGB BLD-MCNC: 11.3 G/DL (ref 12–15.5)
HGB UR QL: NEGATIVE
HYALINE CASTS #/AREA URNS LPF: ABNORMAL /LPF (ref 0–5)
IMM GRANULOCYTES # BLD AUTO: 0 K/MCL (ref 0–0.2)
IMM GRANULOCYTES NFR BLD: 0 %
KETONES UR-MCNC: NEGATIVE MG/DL
LACTATE BLDV-MCNC: 1.5 MMOL/L
LEUKOCYTE ESTERASE UR QL STRIP: NEGATIVE
LIPASE SERPL-CCNC: 115 UNITS/L (ref 73–393)
LYMPHOCYTES # BLD: 0.9 K/MCL (ref 1–4)
LYMPHOCYTES NFR BLD: 18 %
MCH RBC QN AUTO: 29.7 PG (ref 26–34)
MCHC RBC AUTO-ENTMCNC: 34.6 G/DL (ref 32–36.5)
MCV RBC AUTO: 86.1 FL (ref 78–100)
MIXED CELL CASTS #/AREA URNS LPF: ABNORMAL /[LPF]
MONOCYTES # BLD: 0.8 K/MCL (ref 0.3–0.9)
MONOCYTES NFR BLD: 15 %
MUCOUS THREADS URNS QL MICRO: PRESENT
NEUTROPHILS # BLD: 3.5 K/MCL (ref 1.8–7.7)
NEUTROPHILS NFR BLD: 65 %
NEUTS SEG NFR BLD: ABNORMAL %
NITRITE UR QL: NEGATIVE
NRBC (NRBCRE): 0 /100 WBC
NT-PROBNP SERPL-MCNC: 94 PG/ML
PH UR: 7 UNITS (ref 5–7)
PLATELET # BLD: 288 K/MCL (ref 140–450)
POTASSIUM SERPL-SCNC: 3.7 MMOL/L (ref 3.4–5.1)
POTASSIUM SERPL-SCNC: ABNORMAL MMOL/L
PROT SERPL-MCNC: 8.3 G/DL (ref 6.4–8.2)
PROT UR QL: NEGATIVE MG/DL
RBC # BLD: 3.8 MIL/MCL (ref 4–5.2)
RBC #/AREA URNS HPF: ABNORMAL /HPF (ref 0–2)
RBC CASTS #/AREA URNS LPF: ABNORMAL /[LPF]
RENAL EPI CELLS #/AREA URNS HPF: ABNORMAL /[HPF]
SODIUM SERPL-SCNC: 141 MMOL/L (ref 135–145)
SP GR UR: 1.01 (ref 1–1.03)
SPECIMEN SOURCE: ABNORMAL
SPERM URNS QL MICRO: ABNORMAL
SQUAMOUS #/AREA URNS HPF: ABNORMAL /HPF (ref 0–5)
T VAGINALIS URNS QL MICRO: ABNORMAL
TRI-PHOS CRY URNS QL MICRO: ABNORMAL
TROPONIN I SERPL HS-MCNC: <0.02 NG/ML
TROPONIN I SERPL HS-MCNC: <0.02 NG/ML
URATE CRY URNS QL MICRO: ABNORMAL
URINE REFLEX: ABNORMAL
URNS CMNT MICRO: ABNORMAL
UROBILINOGEN UR QL: 0.2 MG/DL (ref 0–1)
WAXY CASTS #/AREA URNS LPF: ABNORMAL /[LPF]
WBC # BLD: 5.3 K/MCL (ref 4.2–11)
WBC #/AREA URNS HPF: ABNORMAL /HPF (ref 0–5)
WBC CASTS #/AREA URNS LPF: ABNORMAL /[LPF]
YEAST HYPHAE URNS QL MICRO: ABNORMAL
YEAST URNS QL MICRO: ABNORMAL

## 2019-09-11 ENCOUNTER — HOSPITAL (OUTPATIENT)
Dept: OTHER | Age: 59
End: 2019-09-11
Attending: INTERNAL MEDICINE

## 2019-10-11 PROBLEM — M77.8 RIGHT SHOULDER TENDONITIS: Status: ACTIVE | Noted: 2019-10-11

## 2019-12-06 ENCOUNTER — HOSPITAL (OUTPATIENT)
Dept: OTHER | Age: 59
End: 2019-12-06
Attending: INTERNAL MEDICINE

## 2019-12-09 LAB — PATHOLOGIST NAME: NORMAL

## 2019-12-13 PROBLEM — M75.102 ROTATOR CUFF SYNDROME OF LEFT SHOULDER: Status: ACTIVE | Noted: 2019-12-13

## 2019-12-16 ENCOUNTER — HOSPITAL (OUTPATIENT)
Dept: OTHER | Age: 59
End: 2019-12-16
Attending: INTERNAL MEDICINE

## 2020-01-10 PROBLEM — M75.112 INCOMPLETE TEAR OF LEFT ROTATOR CUFF, UNSPECIFIED WHETHER TRAUMATIC: Status: ACTIVE | Noted: 2020-01-10

## 2020-02-21 PROBLEM — Z98.890 S/P ROTATOR CUFF REPAIR: Status: ACTIVE | Noted: 2020-02-21

## 2021-01-22 PROBLEM — R29.898 WEAKNESS OF SHOULDER: Status: ACTIVE | Noted: 2021-01-22

## 2021-10-01 ENCOUNTER — LAB REQUISITION (OUTPATIENT)
Dept: LAB | Age: 61
End: 2021-10-01

## 2021-10-01 DIAGNOSIS — Z00.00 ENCOUNTER FOR GENERAL ADULT MEDICAL EXAMINATION WITHOUT ABNORMAL FINDINGS: ICD-10-CM

## 2021-10-01 PROCEDURE — PSEU9049 QUANTIFERON TB PLUS: Performed by: CLINICAL MEDICAL LABORATORY

## 2021-10-01 PROCEDURE — 86480 TB TEST CELL IMMUN MEASURE: CPT | Performed by: CLINICAL MEDICAL LABORATORY

## 2021-10-03 LAB
GAMMA INTERFERON BACKGROUND BLD IA-ACNC: 0.1 IU/ML
M TB IFN-G BLD-IMP: NEGATIVE
M TB IFN-G CD4+ BCKGRND COR BLD-ACNC: 0 IU/ML
M TB IFN-G CD4+CD8+ BCKGRND COR BLD-ACNC: 0 IU/ML
MITOGEN IGNF BCKGRD COR BLD-ACNC: 8.31 IU/ML

## 2022-07-16 ENCOUNTER — OFFICE VISIT (OUTPATIENT)
Dept: SLEEP CENTER | Age: 62
End: 2022-07-16
Attending: FAMILY MEDICINE
Payer: MEDICARE

## 2022-07-16 DIAGNOSIS — G47.33 OSA (OBSTRUCTIVE SLEEP APNEA): ICD-10-CM

## 2022-07-16 PROCEDURE — 95810 POLYSOM 6/> YRS 4/> PARAM: CPT

## 2024-02-19 ENCOUNTER — APPOINTMENT (OUTPATIENT)
Dept: CT IMAGING | Facility: HOSPITAL | Age: 64
End: 2024-02-19
Attending: EMERGENCY MEDICINE
Payer: MEDICARE

## 2024-02-19 ENCOUNTER — HOSPITAL ENCOUNTER (EMERGENCY)
Facility: HOSPITAL | Age: 64
Discharge: HOME | End: 2024-02-19
Attending: EMERGENCY MEDICINE
Payer: MEDICARE

## 2024-02-19 VITALS
HEART RATE: 72 BPM | DIASTOLIC BLOOD PRESSURE: 81 MMHG | BODY MASS INDEX: 31.15 KG/M2 | SYSTOLIC BLOOD PRESSURE: 139 MMHG | OXYGEN SATURATION: 98 % | RESPIRATION RATE: 16 BRPM | TEMPERATURE: 97 F | HEIGHT: 63.5 IN | WEIGHT: 178 LBS

## 2024-02-19 DIAGNOSIS — R19.7 DIARRHEA, UNSPECIFIED TYPE: ICD-10-CM

## 2024-02-19 DIAGNOSIS — R73.9 HYPERGLYCEMIA: Primary | ICD-10-CM

## 2024-02-19 DIAGNOSIS — R10.9 ABDOMINAL PAIN, ACUTE: ICD-10-CM

## 2024-02-19 LAB
ALBUMIN SERPL-MCNC: 4.1 G/DL (ref 3.4–5)
ALBUMIN/GLOB SERPL: 0.9 {RATIO} (ref 1–2)
ALP LIVER SERPL-CCNC: 75 U/L
ALT SERPL-CCNC: 29 U/L
ANION GAP SERPL CALC-SCNC: 7 MMOL/L (ref 0–18)
AST SERPL-CCNC: 17 U/L (ref 15–37)
BASOPHILS # BLD AUTO: 0.06 X10(3) UL (ref 0–0.2)
BASOPHILS NFR BLD AUTO: 0.7 %
BILIRUB SERPL-MCNC: 0.4 MG/DL (ref 0.1–2)
BUN BLD-MCNC: 18 MG/DL (ref 9–23)
CALCIUM BLD-MCNC: 10.1 MG/DL (ref 8.5–10.1)
CHLORIDE SERPL-SCNC: 99 MMOL/L (ref 98–112)
CO2 SERPL-SCNC: 29 MMOL/L (ref 21–32)
CREAT BLD-MCNC: 1.01 MG/DL
EGFRCR SERPLBLD CKD-EPI 2021: 62 ML/MIN/1.73M2 (ref 60–?)
EOSINOPHIL # BLD AUTO: 0.16 X10(3) UL (ref 0–0.7)
EOSINOPHIL NFR BLD AUTO: 2 %
ERYTHROCYTE [DISTWIDTH] IN BLOOD BY AUTOMATED COUNT: 12.7 %
GLOBULIN PLAS-MCNC: 4.7 G/DL (ref 2.8–4.4)
GLUCOSE BLD-MCNC: 192 MG/DL (ref 70–99)
GLUCOSE BLD-MCNC: 207 MG/DL (ref 70–99)
HCT VFR BLD AUTO: 36.4 %
HGB BLD-MCNC: 12.4 G/DL
IMM GRANULOCYTES # BLD AUTO: 0.02 X10(3) UL (ref 0–1)
IMM GRANULOCYTES NFR BLD: 0.2 %
LYMPHOCYTES # BLD AUTO: 3.51 X10(3) UL (ref 1–4)
LYMPHOCYTES NFR BLD AUTO: 43.2 %
MCH RBC QN AUTO: 28.9 PG (ref 26–34)
MCHC RBC AUTO-ENTMCNC: 34.1 G/DL (ref 31–37)
MCV RBC AUTO: 84.8 FL
MONOCYTES # BLD AUTO: 0.89 X10(3) UL (ref 0.1–1)
MONOCYTES NFR BLD AUTO: 10.9 %
NEUTROPHILS # BLD AUTO: 3.49 X10 (3) UL (ref 1.5–7.7)
NEUTROPHILS # BLD AUTO: 3.49 X10(3) UL (ref 1.5–7.7)
NEUTROPHILS NFR BLD AUTO: 43 %
OSMOLALITY SERPL CALC.SUM OF ELEC: 287 MOSM/KG (ref 275–295)
PLATELET # BLD AUTO: 361 10(3)UL (ref 150–450)
POTASSIUM SERPL-SCNC: 3.4 MMOL/L (ref 3.5–5.1)
PROT SERPL-MCNC: 8.8 G/DL (ref 6.4–8.2)
RBC # BLD AUTO: 4.29 X10(6)UL
SODIUM SERPL-SCNC: 135 MMOL/L (ref 136–145)
TROPONIN I SERPL HS-MCNC: 6 NG/L
WBC # BLD AUTO: 8.1 X10(3) UL (ref 4–11)

## 2024-02-19 PROCEDURE — 84484 ASSAY OF TROPONIN QUANT: CPT | Performed by: EMERGENCY MEDICINE

## 2024-02-19 PROCEDURE — 93005 ELECTROCARDIOGRAM TRACING: CPT

## 2024-02-19 PROCEDURE — 85025 COMPLETE CBC W/AUTO DIFF WBC: CPT

## 2024-02-19 PROCEDURE — 99284 EMERGENCY DEPT VISIT MOD MDM: CPT

## 2024-02-19 PROCEDURE — 85025 COMPLETE CBC W/AUTO DIFF WBC: CPT | Performed by: EMERGENCY MEDICINE

## 2024-02-19 PROCEDURE — 36415 COLL VENOUS BLD VENIPUNCTURE: CPT

## 2024-02-19 PROCEDURE — 80053 COMPREHEN METABOLIC PANEL: CPT | Performed by: EMERGENCY MEDICINE

## 2024-02-19 PROCEDURE — 80053 COMPREHEN METABOLIC PANEL: CPT

## 2024-02-19 PROCEDURE — 99285 EMERGENCY DEPT VISIT HI MDM: CPT

## 2024-02-19 PROCEDURE — 82962 GLUCOSE BLOOD TEST: CPT

## 2024-02-19 PROCEDURE — 74177 CT ABD & PELVIS W/CONTRAST: CPT | Performed by: EMERGENCY MEDICINE

## 2024-02-19 RX ORDER — DICYCLOMINE HCL 20 MG
20 TABLET ORAL 4 TIMES DAILY PRN
Qty: 30 TABLET | Refills: 0 | Status: SHIPPED | OUTPATIENT
Start: 2024-02-19 | End: 2024-03-20

## 2024-02-19 RX ORDER — GLIMEPIRIDE 4 MG/1
4 TABLET ORAL
COMMUNITY

## 2024-02-19 RX ORDER — SPIRONOLACTONE 25 MG/1
25 TABLET ORAL DAILY
COMMUNITY

## 2024-02-19 NOTE — ED INITIAL ASSESSMENT (HPI)
Patient to ER w/ complaints of elevated blood sugars since Feb 13th. Blood sugar this afternoon 260. +diarrhea/nausea.

## 2024-02-20 LAB
ATRIAL RATE: 81 BPM
P AXIS: 62 DEGREES
P-R INTERVAL: 142 MS
Q-T INTERVAL: 402 MS
QRS DURATION: 80 MS
QTC CALCULATION (BEZET): 466 MS
R AXIS: 2 DEGREES
T AXIS: 31 DEGREES
VENTRICULAR RATE: 81 BPM

## 2024-02-20 NOTE — ED PROVIDER NOTES
Patient Seen in: [facility] Emergency Department      History     Chief Complaint   Patient presents with    Hyperglycemia     Stated Complaint: pt states having trouble regulating her     Subjective:   HPI    63-year-old woman history of diabetes on oral antihyperglycemic's, hypertension, nonischemic cardiomyopathy here for evaluation of elevated blood sugar readings at home for the past week or so.  Has noticed numbers above 200 states this is unusual for her was taking glimepiride but states it made her feel not well so stopped this a few weeks ago.  Now is only taking metformin 1000 mg twice a day.  States over the past 4 to 5 days has had some lower abdominal cramping multiple episodes nonbloody loose stool.  Reports associated nausea no vomiting.  Denies any chest pain difficulty breathing, fevers, recent antibiotics or travel or contacts with similar symptoms any other complaints.               Objective:   Past Medical History:   Diagnosis Date    Arrhythmia     DIABETES     Diabetes mellitus (HCC)     Essential hypertension 8/9/2019    Head injury     Hypercholesterolemia     HYPERLIPIDEMIA     OBESITY     Other and unspecified hyperlipidemia     Pelvic mass in female 6/14/2017    Sleep apnea     Type II or unspecified type diabetes mellitus without mention of complication, not stated as uncontrolled               Past Surgical History:   Procedure Laterality Date    CHOLECYSTECTOMY      OTHER SURGICAL HISTORY  3/17/2014    Right RCR     PACEMAKER/DEFIBRILLATOR      REPAIR ROTATOR CUFF,CHRONIC  3/17/2014    Procedure: R SHOULDER OPEN ROTATOR CUFF REPAIR;  Surgeon: Lombardi, John A, MD;  Location: [facility]    SIGMOIDOSCOPY,DIAGNOSTIC  9/23/2013    Procedure: FLEXIBLE SIGMOIDOSCOPY WITH BIOPSY, POLYPECTOMY;  Surgeon: Frantz Chavez MD;  Location: [facility]                Social History     Socioeconomic History    Marital status: Legally    Tobacco Use     Smoking status: Never    Smokeless tobacco: Never   Vaping Use    Vaping Use: Never used   Substance and Sexual Activity    Alcohol use: Yes     Alcohol/week: 0.0 - 2.0 standard drinks of alcohol     Comment: occasional 1-2 times per month    Drug use: No    Sexual activity: Not Currently   Other Topics Concern    Caffeine Concern No     Comment: only decaf    Exercise No              Review of Systems    Positive for stated complaint: pt states having trouble regulating her   Other systems are as noted in HPI.  Constitutional and vital signs reviewed.      All other systems reviewed and negative except as noted above.    Physical Exam     ED Triage Vitals   BP 02/19/24 1524 137/81   Pulse 02/19/24 1524 92   Resp 02/19/24 1524 18   Temp 02/19/24 1524 96.8 °F (36 °C)   Temp src 02/19/24 1524 Temporal   SpO2 02/19/24 1524 100 %   O2 Device 02/19/24 1600 None (Room air)       Current:/81   Pulse 72   Temp 96.8 °F (36 °C) (Temporal)   Resp 16   Ht 161.3 cm (5' 3.5\")   Wt 80.7 kg   LMP  (LMP Unknown)   SpO2 98%   BMI 31.04 kg/m²         Physical Exam    Physical Exam  Vitals signs and nursing note reviewed.   General: Well-appearing woman lying supine in the bed in no acute distress  Head: Normocephalic and atraumatic.   HEENT:  Mucous membranes are moist.   Cardiovascular:  Normal rate and regular rhythm.  No Edema  Pulmonary:  Pulmonary effort is normal.  Normal breath sounds. No wheezing, rhonchi or rales.   Abdominal: Soft nontender nondistended, normal bowel sounds, no guarding no rebound tenderness  Skin: Warm and dry  Neurological: Awake alert, speech is normal    ED Course     Labs Reviewed   COMP METABOLIC PANEL (14) - Abnormal; Notable for the following components:       Result Value    Glucose 192 (*)     Sodium 135 (*)     Potassium 3.4 (*)     Total Protein 8.8 (*)     Globulin  4.7 (*)     A/G Ratio 0.9 (*)     All other components within normal limits   POCT GLUCOSE - Abnormal; Notable  for the following components:    POC Glucose 207 (*)     All other components within normal limits   TROPONIN I HIGH SENSITIVITY - Normal   CBC WITH DIFFERENTIAL WITH PLATELET    Narrative:     The following orders were created for panel order CBC With Differential With Platelet.  Procedure                               Abnormality         Status                     ---------                               -----------         ------                     CBC W/ DIFFERENTIAL[[id number]]                              Final result                 Please view results for these tests on the individual orders.   RAINBOW DRAW LAVENDER   RAINBOW DRAW LIGHT GREEN   RAINBOW DRAW BLUE   CBC W/ DIFFERENTIAL     EKG    Rate, intervals and axes as noted on EKG Report.  Rate:   Rhythm: Sinus Rhythm  Reading: No acute ischemic changes               CT ABDOMEN+PELVIS(CONTRAST ONLY)(CPT=74177)    Result Date: 2/19/2024  PROCEDURE:  CT ABDOMEN+PELVIS (CONTRAST ONLY) (CPT=74177)  COMPARISON:  [facility] , CT, CT CHEST+ABDOMEN+PELVIS(ALL CNTRST ONLY)(CPT=71260/01452), 6/12/2017, 7:26 PM.  INDICATIONS:  diffuse low abd pain diarrhea  TECHNIQUE:  CT scanning was performed from the dome of the diaphragm to the pubic symphysis with non-ionic intravenous contrast material. Post contrast coronal MPR imaging was performed.  Dose reduction techniques were used. Dose information is transmitted to the ACR (American College of Radiology) NRDR (National Radiology Data Registry) which includes the Dose Index Registry.  PATIENT STATED HISTORY:(As transcribed by Technologist)  Low abd pain with diarrhea.   CONTRAST USED:  100cc of Isovue 370  FINDINGS:  LUNG BASES:  Dependent atelectasis bilaterally LIVER:  Slight nodular contour to the liver.  0.8 x 0.8 cm hypodense lesion the left lobe of the liver which is stable.  Given stability, this is likely benign.  Is BILIARY:  No intrahepatic biliary dilatation.. SPLEEN:  Normal.  No enlargement or focal lesion.  PANCREAS:  No jerry-pancreatic inflammatory stranding ADRENALS:  Normal.  No mass or enlargement.  KIDNEYS:  Kidneys are symmetrical in size without evidence of hydronephrosis. BOWEL/MESENTERY:  Bowel is normal in caliber. No evidence of obstruction.  No evidence for acute appendicitis.  Appendicoliths or retained contrast material within the appendix. PELVIS:  Bladder is unremarkable in appearance.  No free pelvic fluid.  Stable small 1 cm nodule within the left pelvis which may represent small lymph node..   AORTA/VASCULAR:  Aorta is normal in caliber.  Atheromatous calcifications of the aorta. BONES:  Minimal anterolisthesis of L4 on L5.  Degenerative changes of the thoracic and lumbar spine.             CONCLUSION:  1. No acute findings. 2. Slight nodular contour liver which may represent early cirrhotic changes. 3. Decrease in size of right pelvic mass now measuring 2.6 x 1.8 cm and previously measured 3.5 x 2.6 cm.  Given decrease in size, this is likely benign and may represent a a possible exophytic ovarian cyst or complex lymphocele.   LOCATION:  [facility]   Dictated by (CST): Lisette Guthrie MD on 2/19/2024 at 6:26 PM     Finalized by (CST): Lisette Guthrie MD on 2/19/2024 at 6:31 PM               [facility]                                           Medical Decision Making  63-year-old woman here for evaluation of hyperglycemia, lower abdominal discomfort, intermittent episodes of loose stool.  Differential includes hyperglycemia, DKA, gastroenteritis, diverticulitis, electrolyte abnormality, renal dysfunction, will check basic labs obtain a CT abdomen pelvis, reevaluate.  CT abdomen pelvis with no acute abnormalities, did identify right-sided pelvic mass, smaller than previous imaging which was back in 2017, differential described as exophytic cyst versus lymphocele.  Discussed this with the patient she will follow-up with gynecology for further evaluation.  Regarding her elevated blood sugar, A1c in 12/23 was 9.0  corresponding to an average glucose of 212.  Discussed this with the patient, she will follow-up with her primary care doctor this week for further evaluation will monitor her sugars at home she does not appear to be in a diabetic emergency, has no other acute complaints at this time.  Will use Bentyl as needed for loose stool which she states is improving, will return with a stool sample if symptoms do not continue to improve or worsen.    Problems Addressed:  Abdominal pain, acute: acute illness or injury  Diarrhea, unspecified type: acute illness or injury  Hyperglycemia: acute illness or injury    Amount and/or Complexity of Data Reviewed  External Data Reviewed: labs.     Details: Reviewed A1c from 12/23, 9.0  Labs: ordered. Decision-making details documented in ED Course.  Radiology: ordered and independent interpretation performed. Decision-making details documented in ED Course.     Details: I independently viewed and interpreted the following imaging: CT abdomen pelvis without evidence of bowel obstruction        Disposition and Plan     Clinical Impression:  1. Hyperglycemia    2. Abdominal pain, acute    3. Diarrhea, unspecified type         Disposition:  Discharge  2/19/2024  6:41 pm    Follow-up:  Earline Arzate MD  [address]  [place] [address]  [phone]    Follow up  Follow-up with your PMD for reevaluation in 24 to 48 hours.  Return to ER if symptoms worsen or change or if any other new concerns.    Vikas Freeman MD  [address]  [facility] [address]  [phone]    Schedule an appointment as soon as possible for a visit in 1 day(s)  Gynecology for further evaluation of right-sided pelvic mass that appears to be decreased in size from previous scan.          Medications Prescribed:  Discharge Medication List as of 2/19/2024  6:58 PM        START taking these medications    Details   dicyclomine 20 MG Oral Tab Take 1 tablet (20 mg total) by  mouth 4 (four) times daily as needed., Normal, Disp-30 tablet, R-0

## 2025-07-28 ENCOUNTER — APPOINTMENT (OUTPATIENT)
Dept: GENERAL RADIOLOGY | Facility: HOSPITAL | Age: 65
End: 2025-07-28

## 2025-07-28 ENCOUNTER — HOSPITAL ENCOUNTER (EMERGENCY)
Facility: HOSPITAL | Age: 65
Discharge: HOME | End: 2025-07-28
Attending: EMERGENCY MEDICINE

## 2025-07-28 VITALS
DIASTOLIC BLOOD PRESSURE: 78 MMHG | OXYGEN SATURATION: 99 % | SYSTOLIC BLOOD PRESSURE: 121 MMHG | RESPIRATION RATE: 16 BRPM | TEMPERATURE: 99 F | HEART RATE: 80 BPM

## 2025-07-28 DIAGNOSIS — R05.8 POST-VIRAL COUGH SYNDROME: Primary | ICD-10-CM

## 2025-07-28 DIAGNOSIS — R07.89 CHEST WALL PAIN: ICD-10-CM

## 2025-07-28 LAB
ANION GAP SERPL CALC-SCNC: 10 MMOL/L (ref 0–18)
BASOPHILS # BLD AUTO: 0.07 X10(3) UL (ref 0–0.2)
BASOPHILS NFR BLD AUTO: 0.9 %
BUN BLD-MCNC: 15 MG/DL (ref 9–23)
CALCIUM BLD-MCNC: 9.7 MG/DL (ref 8.7–10.6)
CHLORIDE SERPL-SCNC: 101 MMOL/L (ref 98–112)
CO2 SERPL-SCNC: 27 MMOL/L (ref 21–32)
CREAT BLD-MCNC: 0.94 MG/DL (ref 0.55–1.02)
D DIMER PPP FEU-MCNC: <0.27 UG/ML FEU (ref ?–0.66)
EGFRCR SERPLBLD CKD-EPI 2021: 67 ML/MIN/1.73M2 (ref 60–?)
EOSINOPHIL # BLD AUTO: 0.15 X10(3) UL (ref 0–0.7)
EOSINOPHIL NFR BLD AUTO: 1.8 %
ERYTHROCYTE [DISTWIDTH] IN BLOOD BY AUTOMATED COUNT: 12.2 %
GLUCOSE BLD-MCNC: 107 MG/DL (ref 70–99)
HCT VFR BLD AUTO: 32.3 % (ref 35–48)
HGB BLD-MCNC: 11.2 G/DL (ref 12–16)
IMM GRANULOCYTES # BLD AUTO: 0.03 X10(3) UL (ref 0–1)
IMM GRANULOCYTES NFR BLD: 0.4 %
LYMPHOCYTES # BLD AUTO: 3.57 X10(3) UL (ref 1–4)
LYMPHOCYTES NFR BLD AUTO: 43.8 %
MCH RBC QN AUTO: 29.8 PG (ref 26–34)
MCHC RBC AUTO-ENTMCNC: 34.7 G/DL (ref 31–37)
MCV RBC AUTO: 85.9 FL (ref 80–100)
MONOCYTES # BLD AUTO: 0.88 X10(3) UL (ref 0.1–1)
MONOCYTES NFR BLD AUTO: 10.8 %
NEUTROPHILS # BLD AUTO: 3.45 X10 (3) UL (ref 1.5–7.7)
NEUTROPHILS # BLD AUTO: 3.45 X10(3) UL (ref 1.5–7.7)
NEUTROPHILS NFR BLD AUTO: 42.3 %
OSMOLALITY SERPL CALC.SUM OF ELEC: 287 MOSM/KG (ref 275–295)
PLATELET # BLD AUTO: 416 10(3)UL (ref 150–450)
POTASSIUM SERPL-SCNC: 4 MMOL/L (ref 3.5–5.1)
RBC # BLD AUTO: 3.76 X10(6)UL (ref 3.8–5.3)
SODIUM SERPL-SCNC: 138 MMOL/L (ref 136–145)
TROPONIN I SERPL HS-MCNC: 4 NG/L (ref ?–34)
WBC # BLD AUTO: 8.2 X10(3) UL (ref 4–11)

## 2025-07-28 PROCEDURE — 80048 BASIC METABOLIC PNL TOTAL CA: CPT | Performed by: EMERGENCY MEDICINE

## 2025-07-28 PROCEDURE — 36415 COLL VENOUS BLD VENIPUNCTURE: CPT

## 2025-07-28 PROCEDURE — 99285 EMERGENCY DEPT VISIT HI MDM: CPT

## 2025-07-28 PROCEDURE — 71045 X-RAY EXAM CHEST 1 VIEW: CPT

## 2025-07-28 PROCEDURE — 85025 COMPLETE CBC W/AUTO DIFF WBC: CPT | Performed by: EMERGENCY MEDICINE

## 2025-07-28 PROCEDURE — 93010 ELECTROCARDIOGRAM REPORT: CPT

## 2025-07-28 PROCEDURE — 93005 ELECTROCARDIOGRAM TRACING: CPT

## 2025-07-28 PROCEDURE — 85379 FIBRIN DEGRADATION QUANT: CPT | Performed by: EMERGENCY MEDICINE

## 2025-07-28 PROCEDURE — 99284 EMERGENCY DEPT VISIT MOD MDM: CPT

## 2025-07-28 PROCEDURE — 84484 ASSAY OF TROPONIN QUANT: CPT | Performed by: EMERGENCY MEDICINE

## 2025-07-29 LAB
ATRIAL RATE: 77 BPM
P AXIS: 49 DEGREES
P-R INTERVAL: 148 MS
Q-T INTERVAL: 416 MS
QRS DURATION: 78 MS
QTC CALCULATION (BEZET): 470 MS
R AXIS: 1 DEGREES
T AXIS: 25 DEGREES
VENTRICULAR RATE: 77 BPM

## (undated) NOTE — IP AVS SNAPSHOT
[facility] [place] [address] [place], [address] ~ [phone]                Discharge Summary   6/12/2017    Erica Hyde           Admission Information        Provider Department    6/12/2017 Cesar Nieves MD  [id number]         Than Last time this was given:  20 mg on 6/14/2017  9:14 AM   Commonly known as:  LIPITOR   What changed:    - when to take this  - additional instructions   Next dose due:  6/14        TAKE 1 TABLET BY MOUTH IN THE EVENING    Gonzalo Farias would like to have him involved with follow up of pelvic mass. Follow-up Information     Follow up with Annie Serra MD On 6/28/2017.     Specialty:  Internal Medicine    Why:  [facility] appointment (Primary Care) on 6/28 at 2:15 Pneumovax 23  Deferred ()    TDAP 6/19/2012      Recent Hematology Lab Results  (Last 3 results in the past 90 days)    WBC RBC Hemoglobin Hematocrit MCV MCH MCHC RDW Platelet MPV    (24/98/70)  7.7 (06/14/17)  3.44 (L) (06/14/17)  10.0 (L) (06/14/17)  29 Radiology Exams     None         Additional Information       We are concerned for your overall well being:    - If you are a smoker or have smoked in the last 12 months, we encourage you to explore options for quitting.     - If you have concerns relat Most common side effects: Dizziness, constipation, abnormal liver function   What to report to your healthcare team:  Dizziness, muscle aches, constipation           Salicylates     Salicylates    aspirin 81 MG Oral Tab         Use:  “Thinning” of blood to Glucose Blood (LENIN CONTOUR NEXT TEST) In Vitro Strip    Blood Glucose Calibration (LENIN CONTOUR NEXT CONTROL) NORMAL In Vitro Solution

## (undated) NOTE — LETTER
Charla 15, 2017      Patient: Maria De Jesus Brewster   YOB: 1960   Date of Visit: 6/12/2017         To Whom It May Concern:   This certifies that Maria De Jesus Brewster was under my care at [facility] from 6/12/17 - 6/15/17 Please excuse her from work unt

## (undated) NOTE — ED AVS SNAPSHOT
[facility] Emergency Department    [place]  [address] [place] [address]    Phone:  [phone]    Fax:  [phone]           Kelly Mo   MRN: [id number]    Department:  [facility] Emergency Department   Date of Visit:  5/4/201 while on his this medication. It will make you sleepy. Do not take with other Tylenol containing products. ). CONTINUE taking these medications     LENIN CONTOUR NEXT CONTROL Normal Soln   Quantity:  1 each   1 vial by In Vitro route as needed.  Use receive this, we would really appreciate it if you could take the time to complete it. Thank you! You were examined and treated today on an urgent basis only. This was not a substitute for ongoing medical care.  Often, one Emergency Department visit d 4455  [facility] [place] ([address]) [address] ([address]) [address]  [facility]  [address] ([address]) 677 joint space narrowing consistent with arthropathy. No dislocation. nGamehart     Call the [employer] for assistance with your inactive [employer] account    If you have questions, you can call [phone] to talk to our [facility] Staff.  Joselo

## (undated) NOTE — LETTER
06/14/2017    Kisha JACK Linnette      To Whom It May Concern:     This letter has been written at the patient's request. The above patient was seen at [facility] for treatment of a medical condition from 6/12/17 to 6/14/17    The patient may return to work o

## (undated) NOTE — LETTER
May 4, 2017    Patient: Ugo Meek   Date of Visit: 5/4/2017       To Whom It May Concern: Gautam Oreilly was seen and treated in our emergency department on 5/4/2017.  She can return to work with these limitations: Limited weightbearing of the right u

## (undated) NOTE — ED AVS SNAPSHOT
[facility] Emergency Department    [place]  [address] [place] [address]    Phone:  [phone]    Fax:  [phone]           Dallin Ansari   MRN: [id number]    Department:  [facility] Emergency Department   Date of Visit:  5/4/201 IF THERE IS ANY CHANGE OR WORSENING OF YOUR CONDITION, CALL YOUR PRIMARY CARE PHYSICIAN AT ONCE OR RETURN IMMEDIATELY TO THE EMERGENCY DEPARTMENT.     If you have been prescribed any medication(s), please fill your prescription right away and begin taking t